# Patient Record
Sex: MALE | Race: WHITE | NOT HISPANIC OR LATINO | URBAN - METROPOLITAN AREA
[De-identification: names, ages, dates, MRNs, and addresses within clinical notes are randomized per-mention and may not be internally consistent; named-entity substitution may affect disease eponyms.]

---

## 2018-05-10 ENCOUNTER — OFFICE VISIT (OUTPATIENT)
Dept: OCCUPATIONAL MEDICINE | Age: 18
End: 2018-05-10

## 2018-05-10 DIAGNOSIS — Z00.8 HEALTH EXAMINATION IN POPULATION SURVEY: Primary | ICD-10-CM

## 2018-05-10 PROCEDURE — OH143 RAPID TEST DRUG KIT & COLLECTION 10 PANEL: Performed by: PREVENTIVE MEDICINE

## 2019-08-01 ENCOUNTER — ANCILLARY PROCEDURE (OUTPATIENT)
Dept: RADIOLOGY | Facility: URGENT CARE | Age: 19
End: 2019-08-01
Payer: COMMERCIAL

## 2019-08-01 ENCOUNTER — OFFICE VISIT (OUTPATIENT)
Dept: URGENT CARE | Facility: URGENT CARE | Age: 19
End: 2019-08-01
Payer: COMMERCIAL

## 2019-08-01 VITALS
BODY MASS INDEX: 27.17 KG/M2 | WEIGHT: 205 LBS | HEART RATE: 59 BPM | HEIGHT: 73 IN | DIASTOLIC BLOOD PRESSURE: 68 MMHG | OXYGEN SATURATION: 100 % | TEMPERATURE: 98.9 F | RESPIRATION RATE: 20 BRPM | SYSTOLIC BLOOD PRESSURE: 141 MMHG

## 2019-08-01 DIAGNOSIS — J34.89 NASAL PAIN: ICD-10-CM

## 2019-08-01 DIAGNOSIS — H57.11 ORBITAL PAIN, RIGHT: ICD-10-CM
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
## 2019-08-01 DIAGNOSIS — F07.81 POST CONCUSSION SYNDROME: Primary | ICD-10-CM

## 2019-08-01 PROCEDURE — 70200 X-RAY EXAM OF EYE SOCKETS: CPT | Mod: TC | Performed by: NURSE PRACTITIONER

## 2019-08-01 PROCEDURE — 99203 OFFICE O/P NEW LOW 30 MIN: CPT | Performed by: NURSE PRACTITIONER

## 2019-08-01 PROCEDURE — 70160 X-RAY EXAM OF NASAL BONES: CPT | Mod: TC | Performed by: NURSE PRACTITIONER

## 2019-08-01 ASSESSMENT — ENCOUNTER SYMPTOMS
NAUSEA: 1
NERVOUS/ANXIOUS: 0
SINUS PAIN: 0
WHEEZING: 0
RHINORRHEA: 0
FEVER: 0
HEADACHES: 1
TREMORS: 0
SHORTNESS OF BREATH: 0
SINUS PRESSURE: 0
DIZZINESS: 0
ABDOMINAL PAIN: 0
LIGHT-HEADEDNESS: 0
CONFUSION: 0
FACIAL ASYMMETRY: 0
ARTHRALGIAS: 0
WEAKNESS: 0
COUGH: 0
SEIZURES: 0
JOINT SWELLING: 0
AGITATION: 0
SORE THROAT: 0
CHILLS: 0
PALPITATIONS: 0
SPEECH DIFFICULTY: 0
ROS GI COMMENTS: REFER TO HPI.

## 2019-08-01 ASSESSMENT — PAIN SCALES - GENERAL: PAINLEVEL: 2
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
## 2019-08-06 ENCOUNTER — OFFICE VISIT (OUTPATIENT)
Dept: FAMILY MEDICINE | Facility: CLINIC | Age: 19
End: 2019-08-06
Payer: COMMERCIAL

## 2019-08-06 VITALS
BODY MASS INDEX: 28.1 KG/M2 | TEMPERATURE: 97.9 F | SYSTOLIC BLOOD PRESSURE: 126 MMHG | DIASTOLIC BLOOD PRESSURE: 74 MMHG | HEART RATE: 60 BPM | WEIGHT: 212 LBS | OXYGEN SATURATION: 97 % | HEIGHT: 73 IN

## 2019-08-06 DIAGNOSIS — S06.0X0D CONCUSSION WITHOUT LOSS OF CONSCIOUSNESS, SUBSEQUENT ENCOUNTER: Primary | ICD-10-CM

## 2019-08-06 PROCEDURE — 99213 OFFICE O/P EST LOW 20 MIN: CPT | Performed by: FAMILY MEDICINE

## 2019-08-06 ASSESSMENT — PAIN SCALES - GENERAL: PAINLEVEL: 0-NO PAIN
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
